# Patient Record
Sex: FEMALE | Race: WHITE | NOT HISPANIC OR LATINO | Employment: OTHER | URBAN - NONMETROPOLITAN AREA
[De-identification: names, ages, dates, MRNs, and addresses within clinical notes are randomized per-mention and may not be internally consistent; named-entity substitution may affect disease eponyms.]

---

## 2020-08-06 ENCOUNTER — HOSPITAL ENCOUNTER (EMERGENCY)
Facility: HOSPITAL | Age: 85
Discharge: HOME | End: 2020-08-06
Attending: EMERGENCY MEDICINE | Admitting: EMERGENCY MEDICINE

## 2020-08-06 ENCOUNTER — APPOINTMENT (OUTPATIENT)
Dept: CT IMAGING | Facility: HOSPITAL | Age: 85
End: 2020-08-06

## 2020-08-06 VITALS
RESPIRATION RATE: 18 BRPM | TEMPERATURE: 98.1 F | OXYGEN SATURATION: 98 % | WEIGHT: 238 LBS | HEIGHT: 65 IN | HEART RATE: 71 BPM | SYSTOLIC BLOOD PRESSURE: 144 MMHG | BODY MASS INDEX: 39.65 KG/M2 | DIASTOLIC BLOOD PRESSURE: 73 MMHG

## 2020-08-06 DIAGNOSIS — R55 SYNCOPE AND COLLAPSE: Primary | ICD-10-CM

## 2020-08-06 LAB
ALBUMIN SERPL-MCNC: 4.4 G/DL (ref 3.5–5.2)
ALBUMIN/GLOB SERPL: 1.6 G/DL
ALP SERPL-CCNC: 92 U/L (ref 39–117)
ALT SERPL W P-5'-P-CCNC: 9 U/L (ref 1–33)
ANION GAP SERPL CALCULATED.3IONS-SCNC: 14 MMOL/L (ref 5–15)
APTT PPP: 26.7 SECONDS (ref 24.1–35)
AST SERPL-CCNC: 17 U/L (ref 1–32)
BASOPHILS # BLD AUTO: 0.03 10*3/MM3 (ref 0–0.2)
BASOPHILS NFR BLD AUTO: 0.4 % (ref 0–1.5)
BILIRUB SERPL-MCNC: 0.2 MG/DL (ref 0–1.2)
BUN SERPL-MCNC: 36 MG/DL (ref 8–23)
BUN/CREAT SERPL: 29.3 (ref 7–25)
CALCIUM SPEC-SCNC: 10.9 MG/DL (ref 8.6–10.5)
CHLORIDE SERPL-SCNC: 102 MMOL/L (ref 98–107)
CO2 SERPL-SCNC: 25 MMOL/L (ref 22–29)
CREAT SERPL-MCNC: 1.23 MG/DL (ref 0.57–1)
DEPRECATED RDW RBC AUTO: 38.7 FL (ref 37–54)
EOSINOPHIL # BLD AUTO: 0.14 10*3/MM3 (ref 0–0.4)
EOSINOPHIL NFR BLD AUTO: 2 % (ref 0.3–6.2)
ERYTHROCYTE [DISTWIDTH] IN BLOOD BY AUTOMATED COUNT: 12.2 % (ref 12.3–15.4)
GFR SERPL CREATININE-BSD FRML MDRD: 42 ML/MIN/1.73
GFR SERPL CREATININE-BSD FRML MDRD: 51 ML/MIN/1.73
GLOBULIN UR ELPH-MCNC: 2.8 GM/DL
GLUCOSE SERPL-MCNC: 141 MG/DL (ref 65–99)
HCT VFR BLD AUTO: 35 % (ref 34–46.6)
HGB BLD-MCNC: 12.1 G/DL (ref 12–15.9)
IMM GRANULOCYTES # BLD AUTO: 0.01 10*3/MM3 (ref 0–0.05)
IMM GRANULOCYTES NFR BLD AUTO: 0.1 % (ref 0–0.5)
INR PPP: 0.98 (ref 0.91–1.09)
LYMPHOCYTES # BLD AUTO: 1.33 10*3/MM3 (ref 0.7–3.1)
LYMPHOCYTES NFR BLD AUTO: 19.4 % (ref 19.6–45.3)
MAGNESIUM SERPL-MCNC: 2.1 MG/DL (ref 1.6–2.4)
MCH RBC QN AUTO: 30 PG (ref 26.6–33)
MCHC RBC AUTO-ENTMCNC: 34.6 G/DL (ref 31.5–35.7)
MCV RBC AUTO: 86.6 FL (ref 79–97)
MONOCYTES # BLD AUTO: 0.5 10*3/MM3 (ref 0.1–0.9)
MONOCYTES NFR BLD AUTO: 7.3 % (ref 5–12)
NEUTROPHILS NFR BLD AUTO: 4.85 10*3/MM3 (ref 1.7–7)
NEUTROPHILS NFR BLD AUTO: 70.8 % (ref 42.7–76)
NRBC BLD AUTO-RTO: 0 /100 WBC (ref 0–0.2)
PLATELET # BLD AUTO: 238 10*3/MM3 (ref 140–450)
PMV BLD AUTO: 10 FL (ref 6–12)
POTASSIUM SERPL-SCNC: 3.8 MMOL/L (ref 3.5–5.2)
PROT SERPL-MCNC: 7.2 G/DL (ref 6–8.5)
PROTHROMBIN TIME: 12.6 SECONDS (ref 11.9–14.6)
RBC # BLD AUTO: 4.04 10*6/MM3 (ref 3.77–5.28)
SODIUM SERPL-SCNC: 141 MMOL/L (ref 136–145)
WBC # BLD AUTO: 6.86 10*3/MM3 (ref 3.4–10.8)

## 2020-08-06 PROCEDURE — 85610 PROTHROMBIN TIME: CPT | Performed by: EMERGENCY MEDICINE

## 2020-08-06 PROCEDURE — 85025 COMPLETE CBC W/AUTO DIFF WBC: CPT | Performed by: EMERGENCY MEDICINE

## 2020-08-06 PROCEDURE — 70450 CT HEAD/BRAIN W/O DYE: CPT

## 2020-08-06 PROCEDURE — 80053 COMPREHEN METABOLIC PANEL: CPT | Performed by: EMERGENCY MEDICINE

## 2020-08-06 PROCEDURE — 83735 ASSAY OF MAGNESIUM: CPT | Performed by: EMERGENCY MEDICINE

## 2020-08-06 PROCEDURE — 85730 THROMBOPLASTIN TIME PARTIAL: CPT | Performed by: EMERGENCY MEDICINE

## 2020-08-06 PROCEDURE — 93010 ELECTROCARDIOGRAM REPORT: CPT | Performed by: INTERNAL MEDICINE

## 2020-08-06 PROCEDURE — 93005 ELECTROCARDIOGRAM TRACING: CPT | Performed by: EMERGENCY MEDICINE

## 2020-08-06 PROCEDURE — 99283 EMERGENCY DEPT VISIT LOW MDM: CPT

## 2020-08-06 RX ORDER — SODIUM CHLORIDE 0.9 % (FLUSH) 0.9 %
10 SYRINGE (ML) INJECTION AS NEEDED
Status: DISCONTINUED | OUTPATIENT
Start: 2020-08-06 | End: 2020-08-06 | Stop reason: HOSPADM

## 2020-08-06 RX ORDER — BUMETANIDE 2 MG/1
2 TABLET ORAL DAILY
COMMUNITY

## 2020-08-06 RX ORDER — POTASSIUM CHLORIDE 750 MG/1
10 CAPSULE, EXTENDED RELEASE ORAL DAILY
COMMUNITY

## 2020-08-06 RX ORDER — ACETAMINOPHEN,DIPHENHYDRAMINE HCL 500; 25 MG/1; MG/1
1 TABLET, FILM COATED ORAL NIGHTLY PRN
COMMUNITY

## 2020-08-06 RX ORDER — INDOMETHACIN 75 MG/1
75 CAPSULE, EXTENDED RELEASE ORAL
COMMUNITY
End: 2020-09-23 | Stop reason: SINTOL

## 2020-08-06 RX ORDER — ATORVASTATIN CALCIUM 20 MG/1
20 TABLET, FILM COATED ORAL NIGHTLY
COMMUNITY

## 2020-08-06 RX ORDER — LOSARTAN POTASSIUM 50 MG/1
100 TABLET ORAL DAILY
COMMUNITY
End: 2020-09-23 | Stop reason: SDUPTHER

## 2020-08-06 RX ORDER — METOPROLOL SUCCINATE 100 MG/1
100 TABLET, EXTENDED RELEASE ORAL DAILY
COMMUNITY

## 2020-08-06 RX ORDER — AMLODIPINE BESYLATE 5 MG/1
5 TABLET ORAL DAILY
COMMUNITY
End: 2020-09-23 | Stop reason: SINTOL

## 2020-08-06 RX ORDER — ASPIRIN 81 MG/1
81 TABLET ORAL DAILY
COMMUNITY

## 2020-08-06 NOTE — ED PROVIDER NOTES
Subjective   Patient says she was at home sitting on her porch with a friend when all of a sudden her vision became blurred.  She describes it is just a fuzzy part of her vision.  She closed her eyes to try to see if it would pass and then apparently became unresponsive for period of time.  Her friend tried to get aroused by shaking her and she would not arouse.  Her friend called for help.  Patient says she has come back around and feels fine now.  She has had this happen to her before with revision but usually just closes her eyes and lets it pass.  She is never had it checked out before because she is now about it was too serious.  She however has never known of any periods of unresponsiveness.  Her granddaughter was concerned that she may have had some little facial droop.  She felt like her  strength could not be tested well because she has arthritis her hands and they are not real strong anyway.  She want to get her checked out.      History provided by:  Patient   used: No    Altered Mental Status   Presenting symptoms: unresponsiveness    Severity:  Moderate  Most recent episode:  Today  Episode history:  Single  Duration:  15 minutes  Timing:  Constant  Progression:  Resolved  Chronicity:  Recurrent  Context: not alcohol use, not dementia, not drug use, not head injury, not homeless, taking medications as prescribed, not nursing home resident, not recent change in medication, not recent illness and not recent infection    Associated symptoms: visual change    Associated symptoms: no abdominal pain, normal movement, no agitation, no bladder incontinence, no decreased appetite, no depression, no difficulty breathing, no eye deviation, no fever, no hallucinations, no headaches, no light-headedness, no nausea, no palpitations, no rash, no seizures, no slurred speech, no suicidal behavior, no vomiting and no weakness        Review of Systems   Constitutional: Negative.  Negative for  decreased appetite and fever.   HENT: Negative.    Eyes: Positive for visual disturbance.   Respiratory: Negative.    Cardiovascular: Negative.  Negative for palpitations.   Gastrointestinal: Negative.  Negative for abdominal pain, nausea and vomiting.   Genitourinary: Negative.  Negative for bladder incontinence.   Musculoskeletal: Negative.    Skin: Negative.  Negative for rash.   Neurological: Positive for syncope. Negative for seizures, weakness, light-headedness and headaches.   Psychiatric/Behavioral: Negative for agitation and hallucinations.   All other systems reviewed and are negative.      Past Medical History:   Diagnosis Date   • Arthritis    • Hyperlipidemia    • Hypertension        No Known Allergies    History reviewed. No pertinent surgical history.    History reviewed. No pertinent family history.    Social History     Socioeconomic History   • Marital status:      Spouse name: Not on file   • Number of children: Not on file   • Years of education: Not on file   • Highest education level: Not on file   Tobacco Use   • Smoking status: Never Smoker   Substance and Sexual Activity   • Alcohol use: Not Currently   • Drug use: Never   • Sexual activity: Defer       Prior to Admission medications    Medication Sig Start Date End Date Taking? Authorizing Provider   amLODIPine (NORVASC) 5 MG tablet Take 5 mg by mouth Daily.   Yes Tariq Yost MD   aspirin 81 MG EC tablet Take 81 mg by mouth Daily.   Yes Tariq Yost MD   atorvastatin (LIPITOR) 20 MG tablet Take 20 mg by mouth Every Night.   Yes Tariq Yost MD   bumetanide (BUMEX) 2 MG tablet Take 2 mg by mouth Daily.   Yes Tariq Yost MD   diphenhydrAMINE-acetaminophen (TYLENOL PM)  MG tablet per tablet Take 1 tablet by mouth At Night As Needed for Sleep.   Yes Tariq Yost MD   indomethacin SR (INDOCIN SR) 75 MG CR capsule Take 75 mg by mouth Daily With Breakfast.   Yes Tariq Yost MD    losartan (COZAAR) 50 MG tablet Take 100 mg by mouth Daily.   Yes Tariq Yost MD   metoprolol succinate XL (TOPROL-XL) 100 MG 24 hr tablet Take 100 mg by mouth Daily.   Yes Tariq Yost MD   potassium chloride (MICRO-K) 10 MEQ CR capsule Take 10 mEq by mouth Daily.   Yes Tariq Yost MD       Medications   sodium chloride 0.9 % flush 10 mL (has no administration in time range)       Vitals:    08/06/20 1715   BP: 131/72   Pulse: 64   Resp: 14   Temp:    SpO2: 97%         Objective   Physical Exam   Constitutional: She is oriented to person, place, and time. She appears well-developed and well-nourished.   HENT:   Head: Normocephalic and atraumatic.   Eyes: Pupils are equal, round, and reactive to light. EOM are normal.   There does seem to be some slower movement of the left eyelid compared to the right.  I cannot really appreciate a facial droop.   Neck: Normal range of motion. Neck supple.   Cardiovascular: Normal rate and regular rhythm.   Pulmonary/Chest: Effort normal.   Musculoskeletal: Normal range of motion.   Neurological: She is alert and oriented to person, place, and time.   Skin: Skin is warm and dry.   Psychiatric: She has a normal mood and affect. Her behavior is normal.   Nursing note and vitals reviewed.      Procedures         Lab Results (last 24 hours)     Procedure Component Value Units Date/Time    CBC & Differential [[id number]] Collected:  08/06/20 1704    Specimen:  Blood Updated:  08/06/20 1710    Narrative:       The following orders were created for panel order CBC & Differential.  Procedure                               Abnormality         Status                     ---------                               -----------         ------                     CBC Auto Differential[[id number]]        Abnormal            Final result                 Please view results for these tests on the individual orders.    Protime-INR [[id number]]  (Normal) Collected:  08/06/20  1704    Specimen:  Blood Updated:  08/06/20 1719     Protime 12.6 Seconds      INR 0.98    aPTT [[id number]]  (Normal) Collected:  08/06/20 1704    Specimen:  Blood Updated:  08/06/20 1719     PTT 26.7 seconds     Comprehensive Metabolic Panel [[id number]]  (Abnormal) Collected:  08/06/20 1704    Specimen:  Blood Updated:  08/06/20 1737     Glucose 141 mg/dL      BUN 36 mg/dL      Creatinine 1.23 mg/dL      Sodium 141 mmol/L      Potassium 3.8 mmol/L      Chloride 102 mmol/L      CO2 25.0 mmol/L      Calcium 10.9 mg/dL      Total Protein 7.2 g/dL      Albumin 4.40 g/dL      ALT (SGPT) 9 U/L      AST (SGOT) 17 U/L      Alkaline Phosphatase 92 U/L      Total Bilirubin 0.2 mg/dL      eGFR Non African Amer 42 mL/min/1.73      eGFR  African Amer 51 mL/min/1.73      Globulin 2.8 gm/dL      A/G Ratio 1.6 g/dL      BUN/Creatinine Ratio 29.3     Anion Gap 14.0 mmol/L     Narrative:       GFR Normal >60  Chronic Kidney Disease <60  Kidney Failure <15      Magnesium [[id number]]  (Normal) Collected:  08/06/20 1704    Specimen:  Blood Updated:  08/06/20 1731     Magnesium 2.1 mg/dL     CBC Auto Differential [[id number]]  (Abnormal) Collected:  08/06/20 1704    Specimen:  Blood Updated:  08/06/20 1710     WBC 6.86 10*3/mm3      RBC 4.04 10*6/mm3      Hemoglobin 12.1 g/dL      Hematocrit 35.0 %      MCV 86.6 fL      MCH 30.0 pg      MCHC 34.6 g/dL      RDW 12.2 %      RDW-SD 38.7 fl      MPV 10.0 fL      Platelets 238 10*3/mm3      Neutrophil % 70.8 %      Lymphocyte % 19.4 %      Monocyte % 7.3 %      Eosinophil % 2.0 %      Basophil % 0.4 %      Immature Grans % 0.1 %      Neutrophils, Absolute 4.85 10*3/mm3      Lymphocytes, Absolute 1.33 10*3/mm3      Monocytes, Absolute 0.50 10*3/mm3      Eosinophils, Absolute 0.14 10*3/mm3      Basophils, Absolute 0.03 10*3/mm3      Immature Grans, Absolute 0.01 10*3/mm3      nRBC 0.0 /100 WBC           CT Head Without Contrast   Final Result   1.   No acute intracranial abnormality.   This  report was finalized on 08/06/2020 17:36 by Dr. Gia Jesus MD.          ED Course  ED Course as of Aug 06 1807   Thu Aug 06, 2020   1805 I told the patient her work-up here is pretty much normal.  Her renal functions are little bit elevated but not in a significant or dangerous manner.  Her CT head is unchanged.  Her EKG is.  I went to the differential with her of vasovagal versus TIA versus even a small stroke we do not see.  Offered to put in the hospital for further evaluation quickly but she prefers to go home and I think that is reasonable at this point.  She says she will follow with her physician for further evaluation as indicated.  She is discharged in stable condition.    [TR]      ED Course User Index  [TR] Jose Antonio Salvador Jr., MD          MDM  Number of Diagnoses or Management Options  Syncope and collapse: new and requires workup     Amount and/or Complexity of Data Reviewed  Clinical lab tests: ordered and reviewed  Tests in the radiology section of CPT®: ordered and reviewed  Tests in the medicine section of CPT®: reviewed and ordered    Risk of Complications, Morbidity, and/or Mortality  Presenting problems: moderate  Diagnostic procedures: moderate  Management options: moderate    Patient Progress  Patient progress: stable      Final diagnoses:   Syncope and collapse          Jose Antonio Salvador Jr., MD  08/06/20 1803

## 2020-08-10 ENCOUNTER — TRANSCRIBE ORDERS (OUTPATIENT)
Dept: ADMINISTRATIVE | Facility: HOSPITAL | Age: 85
End: 2020-08-10

## 2020-08-10 DIAGNOSIS — H53.9 UNSPECIFIED VISUAL DISTURBANCE: ICD-10-CM

## 2020-08-10 DIAGNOSIS — R55 SYNCOPE AND COLLAPSE: Primary | ICD-10-CM

## 2020-08-20 ENCOUNTER — HOSPITAL ENCOUNTER (OUTPATIENT)
Dept: MRI IMAGING | Facility: HOSPITAL | Age: 85
Discharge: HOME | End: 2020-08-20
Admitting: NURSE PRACTITIONER

## 2020-08-20 ENCOUNTER — HOSPITAL ENCOUNTER (OUTPATIENT)
Dept: ULTRASOUND IMAGING | Facility: HOSPITAL | Age: 85
Discharge: HOME | End: 2020-08-20

## 2020-08-20 ENCOUNTER — APPOINTMENT (OUTPATIENT)
Dept: CARDIOLOGY | Facility: HOSPITAL | Age: 85
End: 2020-08-20

## 2020-08-20 ENCOUNTER — HOSPITAL ENCOUNTER (OUTPATIENT)
Dept: CARDIOLOGY | Facility: HOSPITAL | Age: 85
Discharge: HOME | End: 2020-08-20

## 2020-08-20 DIAGNOSIS — R55 SYNCOPE AND COLLAPSE: ICD-10-CM

## 2020-08-20 DIAGNOSIS — H53.9 UNSPECIFIED VISUAL DISTURBANCE: ICD-10-CM

## 2020-08-20 PROCEDURE — 93225 XTRNL ECG REC<48 HRS REC: CPT

## 2020-08-20 PROCEDURE — 70551 MRI BRAIN STEM W/O DYE: CPT

## 2020-08-20 PROCEDURE — 93880 EXTRACRANIAL BILAT STUDY: CPT

## 2020-08-20 PROCEDURE — 93880 EXTRACRANIAL BILAT STUDY: CPT | Performed by: SURGERY

## 2020-08-20 PROCEDURE — 93226 XTRNL ECG REC<48 HR SCAN A/R: CPT

## 2020-09-03 PROCEDURE — 93227 XTRNL ECG REC<48 HR R&I: CPT | Performed by: INTERNAL MEDICINE

## 2020-09-23 ENCOUNTER — LAB (OUTPATIENT)
Dept: LAB | Facility: HOSPITAL | Age: 85
End: 2020-09-23

## 2020-09-23 ENCOUNTER — OFFICE VISIT (OUTPATIENT)
Dept: CARDIOLOGY | Facility: CLINIC | Age: 85
End: 2020-09-23

## 2020-09-23 VITALS
WEIGHT: 240 LBS | BODY MASS INDEX: 39.99 KG/M2 | HEIGHT: 65 IN | DIASTOLIC BLOOD PRESSURE: 71 MMHG | SYSTOLIC BLOOD PRESSURE: 101 MMHG | HEART RATE: 73 BPM

## 2020-09-23 DIAGNOSIS — R60.0 BILATERAL LEG EDEMA: ICD-10-CM

## 2020-09-23 DIAGNOSIS — R55 SYNCOPE AND COLLAPSE: Primary | ICD-10-CM

## 2020-09-23 DIAGNOSIS — R06.09 DYSPNEA ON EXERTION: ICD-10-CM

## 2020-09-23 DIAGNOSIS — I10 ESSENTIAL HYPERTENSION: ICD-10-CM

## 2020-09-23 LAB — NT-PROBNP SERPL-MCNC: 671.9 PG/ML (ref 0–1800)

## 2020-09-23 PROCEDURE — 36415 COLL VENOUS BLD VENIPUNCTURE: CPT

## 2020-09-23 PROCEDURE — 93000 ELECTROCARDIOGRAM COMPLETE: CPT | Performed by: INTERNAL MEDICINE

## 2020-09-23 PROCEDURE — 99204 OFFICE O/P NEW MOD 45 MIN: CPT | Performed by: INTERNAL MEDICINE

## 2020-09-23 PROCEDURE — 83880 ASSAY OF NATRIURETIC PEPTIDE: CPT | Performed by: INTERNAL MEDICINE

## 2020-09-23 RX ORDER — LOSARTAN POTASSIUM 50 MG/1
50 TABLET ORAL DAILY
Qty: 30 TABLET | Refills: 11 | Status: SHIPPED | OUTPATIENT
Start: 2020-09-23 | End: 2021-08-31

## 2020-09-23 RX ORDER — METOLAZONE 5 MG/1
5 TABLET ORAL AS NEEDED
COMMUNITY

## 2020-09-23 NOTE — PROGRESS NOTES
"Leslye Bang is a 85 y.o. female. Referred bu pcp for syncope    History of Present Illness     SYNCOPE:  Has only had one episode of loc but has had other spells of dizziness with loss of balance. Her syncope was on 8/6/20 - she was sitting in a chair when she became dizzy. She closed her eyes like she had before but this time she awoke with a neighbor patting her cheeks. \"I was only out for a short time\". She had no other warning and no post-drome. She came to [facility] for eval which was negative. She refused admission. OP Holter is ok for her age and carotid US is ok. She has had no further spells. She denies any recent change of meds.     HTN:  She has been on her current regimen for some time. She checks her bp at home infrequently but its always - \"OK\". Her dizziness does not seem to be orthostatic.    LEG EDEMA:  This is chronic. She is on a loop diuretic and uses Zaroxolyn on a prn basis if the legs are \"more tight\". It is rare that she has to use the Zarox and one dose does the job. She does not have significant kidney dysfunction by CMP (CKD-3). She does not have orthopnea. She does have valdes but not with her usual activities.        The following portions of the patient's history were reviewed and updated as appropriate: allergies, current medications, past family history, past medical history, past social history, past surgical history and problem list.    Patient Active Problem List   Diagnosis   • Syncope and collapse   • Bilateral leg edema   • Dyspnea on exertion    • Hypertension       No Known Allergies    History reviewed. No pertinent family history.    Social History     Socioeconomic History   • Marital status:      Spouse name: Not on file   • Number of children: Not on file   • Years of education: Not on file   • Highest education level: Not on file   Tobacco Use   • Smoking status: Never Smoker   • Smokeless tobacco: Never Used   Substance and Sexual Activity   • Alcohol " use: Not Currently   • Drug use: Never   • Sexual activity: Defer         Current Outpatient Medications:   •  Acetaminophen (TYLENOL PO), Take  by mouth., Disp: , Rfl:   •  aspirin 81 MG EC tablet, Take 81 mg by mouth Daily., Disp: , Rfl:   •  atorvastatin (LIPITOR) 20 MG tablet, Take 20 mg by mouth Every Night., Disp: , Rfl:   •  bumetanide (BUMEX) 2 MG tablet, Take 2 mg by mouth Daily., Disp: , Rfl:   •  diphenhydrAMINE-acetaminophen (TYLENOL PM)  MG tablet per tablet, Take 1 tablet by mouth At Night As Needed for Sleep., Disp: , Rfl:   •  Glucosamine HCl (GLUCOSAMINE PO), Take  by mouth., Disp: , Rfl:   •  losartan (COZAAR) 50 MG tablet, Take 1 tablet by mouth Daily., Disp: 30 tablet, Rfl: 11  •  metOLazone (ZAROXOLYN) 5 MG tablet, Take 5 mg by mouth As Needed., Disp: , Rfl:   •  metoprolol succinate XL (TOPROL-XL) 100 MG 24 hr tablet, Take 100 mg by mouth Daily., Disp: , Rfl:   •  Multiple Vitamin (MULTIVITAMIN PO), Take  by mouth., Disp: , Rfl:   •  Multiple Vitamins-Minerals (BIOCEL PO), Take  by mouth., Disp: , Rfl:   •  potassium chloride (MICRO-K) 10 MEQ CR capsule, Take 10 mEq by mouth Daily., Disp: , Rfl:     History reviewed. No pertinent surgical history.    Review of Systems   Constitutional: Negative for activity change, appetite change, fatigue, fever and unexpected weight change.   HENT: Negative for congestion.    Eyes: Negative for visual disturbance.   Respiratory: Negative for apnea, shortness of breath and wheezing.    Cardiovascular: Positive for leg swelling. Negative for chest pain and palpitations.   Gastrointestinal: Negative for abdominal pain, blood in stool and vomiting.   Endocrine: Negative for cold intolerance and heat intolerance.   Genitourinary: Negative for difficulty urinating and hematuria.   Musculoskeletal: Positive for arthralgias. Negative for myalgias.   Skin: Negative for rash.   Neurological: Positive for dizziness and syncope.   Hematological: Does not  "bruise/bleed easily.   Psychiatric/Behavioral: Negative for sleep disturbance.       /71   Pulse 73   Ht 165.1 cm (65\")   Wt 109 kg (240 lb)   BMI 39.94 kg/m²   Procedures    Objective   Physical Exam  Constitutional:       Appearance: Normal appearance. She is obese.   HENT:      Head: Normocephalic.      Nose: Nose normal.   Eyes:      Pupils: Pupils are equal, round, and reactive to light.   Cardiovascular:      Rate and Rhythm: Normal rate and regular rhythm.      Pulses: Normal pulses.      Heart sounds: No murmur. No friction rub. No gallop.    Pulmonary:      Effort: Pulmonary effort is normal. No respiratory distress.      Breath sounds: Normal breath sounds. No stridor. No wheezing or rhonchi.   Abdominal:      General: Bowel sounds are normal.      Palpations: Abdomen is soft.      Tenderness: There is no abdominal tenderness. There is no guarding or rebound.   Musculoskeletal:         General: Deformity present. No tenderness.      Right lower leg: Edema present.      Left lower leg: Edema present.   Skin:     General: Skin is warm and dry.   Neurological:      General: No focal deficit present.      Mental Status: She is alert and oriented to person, place, and time.   Psychiatric:         Mood and Affect: Mood normal.         Assessment/Plan   Imelda was seen today for hypertension and loss of consciousness.    Diagnoses and all orders for this visit:    Syncope and collapse  Comments:  uncertain etiology but bp is too low today - adjust meds and check ECHO  Orders:  -     ECG 12 Lead  -     Adult Transthoracic Echo Complete W/ Cont if Necessary Per Protocol    Bilateral leg edema  Comments:  check bnp and echo to assess iv volume status  Orders:  -     proBNP; Future    Dyspnea on exertion   -     proBNP; Future    Essential hypertension  Comments:  possibly overmedicated - will stop NSAID's and Norvasc, discuss DASH diet and careful use of Metolazone. reduce Losartan. bp diary to next aleisha " here  Orders:  -     losartan (COZAAR) 50 MG tablet; Take 1 tablet by mouth Daily.                 Return in about 4 weeks (around 10/21/2020) for Next scheduled follow up with pcp.  Orders Placed This Encounter   Procedures   • proBNP     Standing Status:   Future     Standing Expiration Date:   9/23/2021   • ECG 12 Lead     Order Specific Question:   Reason for Exam:     Answer:   [email or web address]   • Adult Transthoracic Echo Complete W/ Cont if Necessary Per Protocol     Order Specific Question:   Reason for exam?     Answer:   Syncope

## 2020-10-09 ENCOUNTER — TELEPHONE (OUTPATIENT)
Dept: CARDIOLOGY | Facility: CLINIC | Age: 85
End: 2020-10-09

## 2020-10-19 ENCOUNTER — HOSPITAL ENCOUNTER (OUTPATIENT)
Dept: CARDIOLOGY | Facility: HOSPITAL | Age: 85
Discharge: HOME | End: 2020-10-19
Admitting: INTERNAL MEDICINE

## 2020-10-19 VITALS
SYSTOLIC BLOOD PRESSURE: 152 MMHG | WEIGHT: 240.3 LBS | HEIGHT: 65 IN | BODY MASS INDEX: 40.04 KG/M2 | DIASTOLIC BLOOD PRESSURE: 59 MMHG

## 2020-10-19 PROCEDURE — 93306 TTE W/DOPPLER COMPLETE: CPT | Performed by: INTERNAL MEDICINE

## 2020-10-19 PROCEDURE — 93306 TTE W/DOPPLER COMPLETE: CPT

## 2020-10-20 LAB
BH CV ECHO MEAS - AO MAX PG (FULL): 3.8 MMHG
BH CV ECHO MEAS - AO MAX PG: 8.9 MMHG
BH CV ECHO MEAS - AO MEAN PG (FULL): 1 MMHG
BH CV ECHO MEAS - AO MEAN PG: 4 MMHG
BH CV ECHO MEAS - AO ROOT AREA (BSA CORRECTED): 1.4
BH CV ECHO MEAS - AO ROOT AREA: 6.6 CM^2
BH CV ECHO MEAS - AO ROOT DIAM: 2.9 CM
BH CV ECHO MEAS - AO V2 MAX: 149 CM/SEC
BH CV ECHO MEAS - AO V2 MEAN: 97.5 CM/SEC
BH CV ECHO MEAS - AO V2 VTI: 34.2 CM
BH CV ECHO MEAS - AVA(I,A): 1.9 CM^2
BH CV ECHO MEAS - AVA(I,D): 1.9 CM^2
BH CV ECHO MEAS - AVA(V,A): 1.9 CM^2
BH CV ECHO MEAS - AVA(V,D): 1.9 CM^2
BH CV ECHO MEAS - BSA(HAYCOCK): 2.3 M^2
BH CV ECHO MEAS - BSA: 2.1 M^2
BH CV ECHO MEAS - BZI_BMI: 39.9 KILOGRAMS/M^2
BH CV ECHO MEAS - BZI_METRIC_HEIGHT: 165.1 CM
BH CV ECHO MEAS - BZI_METRIC_WEIGHT: 108.9 KG
BH CV ECHO MEAS - EDV(CUBED): 110.6 ML
BH CV ECHO MEAS - EDV(MOD-SP4): 61.7 ML
BH CV ECHO MEAS - EDV(TEICH): 107.5 ML
BH CV ECHO MEAS - EF(CUBED): 79.3 %
BH CV ECHO MEAS - EF(MOD-SP4): 66.3 %
BH CV ECHO MEAS - EF(TEICH): 71.5 %
BH CV ECHO MEAS - ESV(CUBED): 22.9 ML
BH CV ECHO MEAS - ESV(MOD-SP4): 20.8 ML
BH CV ECHO MEAS - ESV(TEICH): 30.6 ML
BH CV ECHO MEAS - FS: 40.8 %
BH CV ECHO MEAS - IVS/LVPW: 1
BH CV ECHO MEAS - IVSD: 0.84 CM
BH CV ECHO MEAS - LA DIMENSION: 4.3 CM
BH CV ECHO MEAS - LA/AO: 1.5
BH CV ECHO MEAS - LAT PEAK E' VEL: 9.6 CM/SEC
BH CV ECHO MEAS - LV DIASTOLIC VOL/BSA (35-75): 28.9 ML/M^2
BH CV ECHO MEAS - LV MASS(C)D: 135.2 GRAMS
BH CV ECHO MEAS - LV MASS(C)DI: 63.2 GRAMS/M^2
BH CV ECHO MEAS - LV MAX PG: 5.1 MMHG
BH CV ECHO MEAS - LV MEAN PG: 3 MMHG
BH CV ECHO MEAS - LV SYSTOLIC VOL/BSA (12-30): 9.7 ML/M^2
BH CV ECHO MEAS - LV V1 MAX: 113 CM/SEC
BH CV ECHO MEAS - LV V1 MEAN: 72.7 CM/SEC
BH CV ECHO MEAS - LV V1 VTI: 25.2 CM
BH CV ECHO MEAS - LVIDD: 4.8 CM
BH CV ECHO MEAS - LVIDS: 2.8 CM
BH CV ECHO MEAS - LVLD AP4: 6.5 CM
BH CV ECHO MEAS - LVLS AP4: 5 CM
BH CV ECHO MEAS - LVOT AREA (M): 2.5 CM^2
BH CV ECHO MEAS - LVOT AREA: 2.5 CM^2
BH CV ECHO MEAS - LVOT DIAM: 1.8 CM
BH CV ECHO MEAS - LVPWD: 0.84 CM
BH CV ECHO MEAS - MED PEAK E' VEL: 6.74 CM/SEC
BH CV ECHO MEAS - MV A MAX VEL: 115 CM/SEC
BH CV ECHO MEAS - MV DEC TIME: 0.2 SEC
BH CV ECHO MEAS - MV E MAX VEL: 97.9 CM/SEC
BH CV ECHO MEAS - MV E/A: 0.85
BH CV ECHO MEAS - RAP SYSTOLE: 5 MMHG
BH CV ECHO MEAS - RVSP: 31.8 MMHG
BH CV ECHO MEAS - SI(AO): 105.7 ML/M^2
BH CV ECHO MEAS - SI(CUBED): 41 ML/M^2
BH CV ECHO MEAS - SI(LVOT): 30 ML/M^2
BH CV ECHO MEAS - SI(MOD-SP4): 19.1 ML/M^2
BH CV ECHO MEAS - SI(TEICH): 36 ML/M^2
BH CV ECHO MEAS - SV(AO): 225.9 ML
BH CV ECHO MEAS - SV(CUBED): 87.7 ML
BH CV ECHO MEAS - SV(LVOT): 64.1 ML
BH CV ECHO MEAS - SV(MOD-SP4): 40.9 ML
BH CV ECHO MEAS - SV(TEICH): 76.9 ML
BH CV ECHO MEAS - TR MAX VEL: 259 CM/SEC
BH CV ECHO MEASUREMENTS AVERAGE E/E' RATIO: 11.98
LEFT ATRIUM VOLUME INDEX: 28.6 ML/M2
LEFT ATRIUM VOLUME: 61.1 CM3
MAXIMAL PREDICTED HEART RATE: 137 BPM
STRESS TARGET HR: 116 BPM

## 2020-10-21 ENCOUNTER — OFFICE VISIT (OUTPATIENT)
Dept: CARDIOLOGY | Facility: CLINIC | Age: 85
End: 2020-10-21

## 2020-10-21 VITALS
HEIGHT: 65 IN | OXYGEN SATURATION: 100 % | BODY MASS INDEX: 39.49 KG/M2 | SYSTOLIC BLOOD PRESSURE: 138 MMHG | DIASTOLIC BLOOD PRESSURE: 80 MMHG | HEART RATE: 87 BPM | WEIGHT: 237 LBS

## 2020-10-21 DIAGNOSIS — R06.09 DYSPNEA ON EXERTION: ICD-10-CM

## 2020-10-21 DIAGNOSIS — I47.29 NSVT (NONSUSTAINED VENTRICULAR TACHYCARDIA) (HCC): ICD-10-CM

## 2020-10-21 DIAGNOSIS — R60.0 PEDAL EDEMA: ICD-10-CM

## 2020-10-21 DIAGNOSIS — I47.1 PAROXYSMAL SVT (SUPRAVENTRICULAR TACHYCARDIA) (HCC): ICD-10-CM

## 2020-10-21 DIAGNOSIS — R55 SYNCOPE AND COLLAPSE: Primary | ICD-10-CM

## 2020-10-21 DIAGNOSIS — I10 ESSENTIAL HYPERTENSION: ICD-10-CM

## 2020-10-21 DIAGNOSIS — R60.0 BILATERAL LEG EDEMA: ICD-10-CM

## 2020-10-21 PROCEDURE — 99214 OFFICE O/P EST MOD 30 MIN: CPT | Performed by: INTERNAL MEDICINE

## 2020-10-21 NOTE — PROGRESS NOTES
Imelda Bang  [id number]  1935  85 y.o.  female    Referring Provider: Ton Dow APRN    Reason for  Visit: Here for routine follow up   Cardiac workup test results as below   Has seen Dr Serrato before  Wants me to see her in future   Family here     Subjective    Mild chronic exertional shortness of breath on exertion relieved with rest  No significant cough or wheezing    No palpitations  No associated chest pain  Moderate chronic pedal edema    No fever or chills  No significant expectoration    No hemoptysis  Prior  syncope, woke up on floor  No receeding palpitations, no incontinence of urine or stools, no preceeding chest pain. No aura.  No seizure like activity       Tolerating current medications well with no untoward side effects   Compliant with prescribed medication regimen. Tries to adhere to cardiac diet.         History of present illness:  Imelda Bang is a 85 y.o. yo female with history of essential hypertension   Hyperlipidemia   who presents today for   Chief Complaint   Patient presents with   • Syncope     1 mo - results   .    History  Past Medical History:   Diagnosis Date   • Arthritis    • Hyperlipidemia    • Hypertension    ,   History reviewed. No pertinent surgical history.,   History reviewed. No pertinent family history.,   Social History     Tobacco Use   • Smoking status: Never Smoker   • Smokeless tobacco: Never Used   Substance Use Topics   • Alcohol use: Not Currently   • Drug use: Never   ,     Medications  Current Outpatient Medications   Medication Sig Dispense Refill   • Acetaminophen (TYLENOL PO) Take  by mouth.     • aspirin 81 MG EC tablet Take 81 mg by mouth Daily.     • atorvastatin (LIPITOR) 20 MG tablet Take 20 mg by mouth Every Night.     • bumetanide (BUMEX) 2 MG tablet Take 2 mg by mouth Daily.     • diphenhydrAMINE-acetaminophen (TYLENOL PM)  MG tablet per tablet Take 1 tablet by mouth At Night As Needed for Sleep.     • Glucosamine HCl  "(GLUCOSAMINE PO) Take  by mouth.     • losartan (COZAAR) 50 MG tablet Take 1 tablet by mouth Daily. 30 tablet 11   • metOLazone (ZAROXOLYN) 5 MG tablet Take 5 mg by mouth As Needed.     • metoprolol succinate XL (TOPROL-XL) 100 MG 24 hr tablet Take 100 mg by mouth Daily.     • Multiple Vitamin (MULTIVITAMIN PO) Take  by mouth.     • Multiple Vitamins-Minerals (BIOCEL PO) Take  by mouth.     • potassium chloride (MICRO-K) 10 MEQ CR capsule Take 10 mEq by mouth Daily.       No current facility-administered medications for this visit.        Allergies:  Patient has no known allergies.    Review of Systems  Review of Systems   Constitution: Negative.   HENT: Negative.    Eyes: Negative.    Cardiovascular: Positive for dyspnea on exertion, leg swelling and syncope. Negative for chest pain, claudication, cyanosis, irregular heartbeat, near-syncope, orthopnea, palpitations and paroxysmal nocturnal dyspnea.   Respiratory: Negative.    Endocrine: Negative.    Hematologic/Lymphatic: Negative.    Skin: Negative.    Musculoskeletal: Positive for arthritis, back pain and joint pain.   Gastrointestinal: Negative for anorexia.   Genitourinary: Negative.    Psychiatric/Behavioral: Negative.        Objective     Physical Exam:  /80   Pulse 87   Ht 165.1 cm (65\")   Wt 108 kg (237 lb)   SpO2 100%   BMI 39.44 kg/m²     Physical Exam  Constitutional:       Appearance: She is well-developed.   HENT:      Head: Normocephalic.   Neck:      Musculoskeletal: No edema.      Vascular: Normal carotid pulses. No carotid bruit or JVD.      Trachea: No tracheal tenderness or tracheal deviation.   Cardiovascular:      Rate and Rhythm: Regular rhythm.      Pulses: Normal pulses.      Heart sounds: Normal heart sounds.   Pulmonary:      Effort: Pulmonary effort is normal.      Breath sounds: No stridor.   Abdominal:      General: There is no distension.      Palpations: Abdomen is soft.      Tenderness: There is no abdominal tenderness. "   Musculoskeletal:      Right lower le+ Edema present.      Left lower le+ Edema present.   Skin:     General: Skin is warm.   Neurological:      Mental Status: She is alert.      Cranial Nerves: No cranial nerve deficit.      Sensory: No sensory deficit.   Psychiatric:         Speech: Speech normal.         Behavior: Behavior normal.         Results Review:      Results for orders placed in visit on 20   Adult Transthoracic Echo Complete W/ Cont if Necessary Per Protocol    Narrative · Mild mitral annular calcification is present.  · Estimated right ventricular systolic pressure from tricuspid   regurgitation is normal (<35 mmHg).  · Left ventricular ejection fraction appears to be 66 - 70%. Left   ventricular systolic function is normal.  · Left ventricular diastolic function is consistent with (grade Ia w/high   LAP) impaired relaxation.  · The left atrial cavity is mildly dilated.         Imelda Bang  [facility] - 48 Hour - Connect/Record/Disconnect And Scan Analysis With Report ([id number])  Order# [id number]  Reading physician: Eric Villagomez MD Ordering physician: Ton Dow APRN Study date: 20   Patient Information    Patient Name   Imelda Bang MRN   [id number] Sex   Female  (Age)   1935 (85 y.o.)   Interpretation Summary       · Average HR: 71. Min HR: 51. Max HR: 125.     · Entire report was reviewed.  Monitoring period in days: ~ 1  · The predominant rhythm noted during the testing period was sinus rhythm.     · 221 premature ventricular contractions. PVC burden: <1%   · Single 3 beat run of nonsustained ventricular tachycardia at a maximum rate of 185 bpm at 9:01 AM     · 397  atrial premature contractions:  APC burden: < 1%   · Single 3 beat run of supraventricular Tachycardia at a maximum rate of 152 bpm at 8:57 AM     · No correlated arrhythmia  · No significant pauses above 3 seconds     Conclusion: Baseline rhythm is sinus.  No significant ectopy  Single 3 beat  run of supraventricular tachycardia at a maximum rate of 152 bpm at 8:57 AM  Single 3 beat run of nonsustained ventricular tachycardia at a maximum rate of 185 bpm at 9:01 AM           Study Result    History: Carotid occlusive disease     IMPRESSION:  Impression:  1. There is less than 50% stenosis of the right internal carotid artery.  2. There is less than 50% stenosis of the left internal carotid artery.  3. Antegrade flow is demonstrated in bilateral vertebral arteries.     Comments: Bilateral carotid vertebral arterial duplex scan was  performed.     Grayscale imaging shows intimal thickening and calcified elements at the  carotid bifurcation. The right internal carotid artery peak systolic  velocity is 73.1 cm/sec. The end-diastolic velocity is 16.5 cm/sec. The  right ICA/CCA ratio is approximately 0.53 . These findings correlate  with less than 50% stenosis of the right internal carotid artery.     Grayscale imaging shows intimal thickening and calcified elements at the  carotid bifurcation. The left internal carotid artery peak systolic  velocity is 69.9 cm/sec. The end-diastolic velocity is 19.6 cm/sec. The  left ICA/CCA ratio is approximately 1.00 . These findings correlate with  less than 50% stenosis of the left internal carotid artery.     Antegrade flow is demonstrated in bilateral vertebral arteries.  Greater than 50% stenosis of the right common carotid artery.  This report was finalized on 08/20/2020 10:18 by Dr. Eric Singh MD.          Procedures    Assessment/Plan   Diagnoses and all orders for this visit:    1. Syncope and collapse (Primary)    2. Essential hypertension    3. Dyspnea on exertion     4. Bilateral leg edema    5. NSVT (nonsustained ventricular tachycardia) (CMS/HCC)    6. Paroxysmal SVT (supraventricular tachycardia) (CMS/HCC)    7. Pedal edema            ____________________________________________________________________________________________________________________________________________  Health maintenance and recommendations    Low salt/ HTN/ Heart healthy carbohydrate restricted cardiac diet   The patient is advised to reduce or avoid caffeine or other cardiac stimulants.   Minimize or avoid  NSAID-type medications      Monitor for any signs of bleeding including red or dark stools. Fall precautions.   Advised staying uptodate with immunizations per established standard guidelines.    Offered to give patient  a copy of my notes     Questions were encouraged, asked and answered to the patient's  understanding and satisfaction. Questions if any regarding current medications and side effects, need for refills and importance of compliance to medications stressed.    Reviewed available prior notes, consults, prior visits, laboratory findings, radiology and cardiology relevant reports. Updated chart as applicable. I have reviewed the patient's medical history in detail and updated the computerized patient record as relevant.      Updated patient regarding any new or relevant abnormalities on review of records or any new findings on physical exam. Mentioned to patient about purpose of visit and desirable health short and long term goals and objectives.    Primary to monitor CBC CMP Lipid panel and TSH as applicable    ___________________________________________________________________________________________________________________________________________    Plan    Discussed results of prior testing with patient  Patient expressed understanding  Encouraged and answered all questions   Discussed with the patient and all questioned fully answered. She will call me if any problems arise.      May need Loop recorder and stress test   She declined this for now     If she has recurrence of symptoms will see her back sooner   Use compression stockings 20-30 mm Hg        Return in about 6 months (around 4/21/2021).

## 2021-08-31 DIAGNOSIS — I10 ESSENTIAL HYPERTENSION: ICD-10-CM

## 2021-09-01 RX ORDER — LOSARTAN POTASSIUM 50 MG/1
TABLET ORAL
Qty: 90 TABLET | Refills: 4 | Status: SHIPPED | OUTPATIENT
Start: 2021-09-01